# Patient Record
Sex: FEMALE | Race: OTHER | HISPANIC OR LATINO
[De-identification: names, ages, dates, MRNs, and addresses within clinical notes are randomized per-mention and may not be internally consistent; named-entity substitution may affect disease eponyms.]

---

## 2017-04-20 PROBLEM — Z00.00 ENCOUNTER FOR PREVENTIVE HEALTH EXAMINATION: Status: ACTIVE | Noted: 2017-04-20

## 2017-06-09 PROBLEM — Z96.653 STATUS POST TOTAL BILATERAL KNEE REPLACEMENT: Status: ACTIVE | Noted: 2017-06-09

## 2017-06-12 ENCOUNTER — APPOINTMENT (OUTPATIENT)
Dept: ORTHOPEDIC SURGERY | Facility: CLINIC | Age: 72
End: 2017-06-12

## 2017-06-12 VITALS
SYSTOLIC BLOOD PRESSURE: 114 MMHG | WEIGHT: 200 LBS | BODY MASS INDEX: 35.44 KG/M2 | HEIGHT: 63 IN | DIASTOLIC BLOOD PRESSURE: 68 MMHG | HEART RATE: 62 BPM

## 2017-06-12 DIAGNOSIS — Z96.653 PRESENCE OF ARTIFICIAL KNEE JOINT, BILATERAL: ICD-10-CM

## 2017-06-12 DIAGNOSIS — M17.0 BILATERAL PRIMARY OSTEOARTHRITIS OF KNEE: ICD-10-CM

## 2018-05-15 NOTE — H&P ADULT - ASSESSMENT
72 y.o Female former smoker  with PMHx of HTN, Hyperlipidemia, GERD, Hypothyroidism, eye infection who presented to her cardiologist Dr. Rodriguez c/o CCS Anginal Class 3 symptoms, and an abnormal Stress test and now referred to [facility] for recommended Cardiac Cath with possible intervention if clinically indicated to  r/o suspected underlying ischemia.       - Patient loaded with ASA 325mg POx1 and Plavix 600mg PO x1 prior to procedure  - Consent obtained via  ID # [id number]    Risks & benefits of procedure and alternative therapy have been explained to the patient including but not limited to: allergic reaction, bleeding w/possible need for blood transfusion, infection, renal and vascular compromise, limb damage, arrhythmia, stroke, vessel dissection/perforation, Myocardial infarction, emergent CABG. Informed consent obtained and in chart.

## 2018-05-15 NOTE — H&P ADULT - HISTORY OF PRESENT ILLNESS
***SKELETON:      72 y.o Female with PMHx of       Denies CP,  SOB, palpitations, dizziness, syncope, recent PND/orthopnea, LE edema, or decrease in exercise tolerance.        In light of pt's risk factors, above CCS Anginal Class _____ symptoms, and an abnormal Stress test, pt is now referred to [facility] for recommended Cardiac Cath with possible intervention if clinically indicated to  r/o suspected underlying CAD. ***HISTORY OBTAINED FROM PATIENT'S DAUGHTER CHRISTIANO OROPEZA -RELIABLE HISTORIAN     ****PT TO BRING IN ALL MEDS     72 y.o Female former smoker  with PMHx of HTN, Hyperlipidemia, GERD, Hypothyroidism, who presented to her cardiologist Dr. Rodriguez c/o worsening CP and SOB over the last few months.  Pt states she has been experiencing non radiating sub sternal chest "pressure" with dyspnea on exertion  and intermittent episodes of dizziness when walking a distance of more than one block, resolving after a few minutes of rest.  She denies any palpitations,  syncope, recent PND/orthopnea, or LE edema.  Pt subsequently underwent a Nuclear Stress test on     which revealed       Stress Echo     done   revealed           In light of pt's risk factors, above CCS Anginal Class 3 symptoms, and an abnormal Stress test, pt is now referred to [facility] for recommended Cardiac Cath with possible intervention if clinically indicated to  r/o suspected underlying ischemia. ***HISTORY OBTAINED FROM PATIENT'S DAUGHTER CHRISTIANO OROPEZA -RELIABLE HISTORIAN     ****PT TO BRING IN ALL MEDS     72 y.o Female former smoker  with PMHx of HTN, Hyperlipidemia, GERD, Hypothyroidism, who presented to her cardiologist Dr. Rodriguez c/o worsening CP and SOB over the last few months.  Pt states she has been experiencing non radiating sub sternal chest "pressure" with dyspnea on exertion  and intermittent episodes of dizziness when walking a distance of more than one block, resolving after a few minutes of rest.  She denies any palpitations,  syncope, recent PND/orthopnea, or LE edema. Echo performed on 04/02/18 revealed mild MR/AS/AR/TR.  Mild to moderate pulmonary insufficiency.  LVEF of 55-60%.  Stress Echo 04/06/18 was a normal study demonstrating a normal LV wall motion response to exercise.  LVEF of 70%.  Pt subsequently underwent a Nuclear Stress test on 05/07/18 which revealed mild anterior ischemia, LVEF > 70%.         In light of pt's risk factors, above CCS Anginal Class 3 symptoms, and an abnormal Stress test, pt is now referred to [facility] for recommended Cardiac Cath with possible intervention if clinically indicated to  r/o suspected underlying ischemia. ***HISTORY OBTAINED FROM PATIENT'S DAUGHTER CHRISTIANO OROPEZA -RELIABLE HISTORIAN     72 y.o Female former smoker  with PMHx of HTN, Hyperlipidemia, GERD, Hypothyroidism, eye infection who presented to her cardiologist Dr. Rodriguez c/o worsening CP and SOB over the last few months.  Pt states she has been experiencing non radiating sub sternal chest "pressure" with dyspnea on exertion  and intermittent episodes of dizziness when walking a distance of more than one block, resolving after a few minutes of rest.  She denies any palpitations,  syncope, recent PND/orthopnea, or LE edema. Echo performed on 04/02/18 revealed mild MR/AS/AR/TR.  Mild to moderate pulmonary insufficiency.  LVEF of 55-60%.  Stress Echo 04/06/18 was a normal study demonstrating a normal LV wall motion response to exercise.  LVEF of 70%.  Pt subsequently underwent a Nuclear Stress test on 05/07/18 which revealed mild anterior ischemia, LVEF > 70%.       In light of pt's risk factors, above CCS Anginal Class 3 symptoms, and an abnormal Stress test, pt is now referred to [facility] for recommended Cardiac Cath with possible intervention if clinically indicated to  r/o suspected underlying ischemia.

## 2018-05-17 ENCOUNTER — OUTPATIENT (OUTPATIENT)
Dept: OUTPATIENT SERVICES | Facility: HOSPITAL | Age: 73
LOS: 1 days | Discharge: NURSING, REHAB OR LONG-TERM CARE | End: 2018-05-17
Payer: MEDICARE

## 2018-05-17 DIAGNOSIS — Z96.642 PRESENCE OF LEFT ARTIFICIAL HIP JOINT: Chronic | ICD-10-CM

## 2018-05-17 DIAGNOSIS — I49.9 CARDIAC ARRHYTHMIA, UNSPECIFIED: ICD-10-CM

## 2018-05-17 DIAGNOSIS — Z96.641 PRESENCE OF RIGHT ARTIFICIAL HIP JOINT: Chronic | ICD-10-CM

## 2018-05-17 LAB
ALBUMIN SERPL ELPH-MCNC: 4.3 G/DL — SIGNIFICANT CHANGE UP (ref 3.3–5)
ALP SERPL-CCNC: 86 U/L — SIGNIFICANT CHANGE UP (ref 40–120)
ALT FLD-CCNC: 32 U/L — SIGNIFICANT CHANGE UP (ref 10–45)
ANION GAP SERPL CALC-SCNC: 11 MMOL/L — SIGNIFICANT CHANGE UP (ref 5–17)
APTT BLD: 33.7 SEC — SIGNIFICANT CHANGE UP (ref 27.5–37.4)
AST SERPL-CCNC: 31 U/L — SIGNIFICANT CHANGE UP (ref 10–40)
BASOPHILS NFR BLD AUTO: 0.4 % — SIGNIFICANT CHANGE UP (ref 0–2)
BILIRUB SERPL-MCNC: 0.5 MG/DL — SIGNIFICANT CHANGE UP (ref 0.2–1.2)
BUN SERPL-MCNC: 16 MG/DL — SIGNIFICANT CHANGE UP (ref 7–23)
CALCIUM SERPL-MCNC: 9.7 MG/DL — SIGNIFICANT CHANGE UP (ref 8.4–10.5)
CHLORIDE SERPL-SCNC: 97 MMOL/L — SIGNIFICANT CHANGE UP (ref 96–108)
CHOLEST SERPL-MCNC: 148 MG/DL — SIGNIFICANT CHANGE UP (ref 10–199)
CK MB CFR SERPL CALC: 2 NG/ML — SIGNIFICANT CHANGE UP (ref 0–6.7)
CK SERPL-CCNC: 107 U/L — SIGNIFICANT CHANGE UP (ref 25–170)
CO2 SERPL-SCNC: 28 MMOL/L — SIGNIFICANT CHANGE UP (ref 22–31)
CREAT SERPL-MCNC: 0.92 MG/DL — SIGNIFICANT CHANGE UP (ref 0.5–1.3)
CRP SERPL-MCNC: 0.71 MG/DL — HIGH (ref 0–0.4)
EOSINOPHIL NFR BLD AUTO: 5.2 % — SIGNIFICANT CHANGE UP (ref 0–6)
GLUCOSE SERPL-MCNC: 103 MG/DL — HIGH (ref 70–99)
HBA1C BLD-MCNC: 6 % — HIGH (ref 4–5.6)
HCT VFR BLD CALC: 45.5 % — HIGH (ref 34.5–45)
HDLC SERPL-MCNC: 51 MG/DL — SIGNIFICANT CHANGE UP (ref 40–125)
HGB BLD-MCNC: 15 G/DL — SIGNIFICANT CHANGE UP (ref 11.5–15.5)
INR BLD: 1.09 — SIGNIFICANT CHANGE UP (ref 0.88–1.16)
LIPID PNL WITH DIRECT LDL SERPL: 69 MG/DL — SIGNIFICANT CHANGE UP
LYMPHOCYTES # BLD AUTO: 38.6 % — SIGNIFICANT CHANGE UP (ref 13–44)
MCHC RBC-ENTMCNC: 28.4 PG — SIGNIFICANT CHANGE UP (ref 27–34)
MCHC RBC-ENTMCNC: 33 G/DL — SIGNIFICANT CHANGE UP (ref 32–36)
MCV RBC AUTO: 86.2 FL — SIGNIFICANT CHANGE UP (ref 80–100)
MONOCYTES NFR BLD AUTO: 8.8 % — SIGNIFICANT CHANGE UP (ref 2–14)
NEUTROPHILS NFR BLD AUTO: 47 % — SIGNIFICANT CHANGE UP (ref 43–77)
PLATELET # BLD AUTO: 285 K/UL — SIGNIFICANT CHANGE UP (ref 150–400)
POTASSIUM SERPL-MCNC: 4.4 MMOL/L — SIGNIFICANT CHANGE UP (ref 3.5–5.3)
POTASSIUM SERPL-SCNC: 4.4 MMOL/L — SIGNIFICANT CHANGE UP (ref 3.5–5.3)
PROT SERPL-MCNC: 8.6 G/DL — HIGH (ref 6–8.3)
PROTHROM AB SERPL-ACNC: 12.1 SEC — SIGNIFICANT CHANGE UP (ref 9.8–12.7)
RBC # BLD: 5.28 M/UL — HIGH (ref 3.8–5.2)
RBC # FLD: 13.9 % — SIGNIFICANT CHANGE UP (ref 10.3–16.9)
SODIUM SERPL-SCNC: 136 MMOL/L — SIGNIFICANT CHANGE UP (ref 135–145)
TOTAL CHOLESTEROL/HDL RATIO MEASUREMENT: 2.9 RATIO — LOW (ref 3.3–7.1)
TRIGL SERPL-MCNC: 138 MG/DL — SIGNIFICANT CHANGE UP (ref 10–149)
WBC # BLD: 6.7 K/UL — SIGNIFICANT CHANGE UP (ref 3.8–10.5)
WBC # FLD AUTO: 6.7 K/UL — SIGNIFICANT CHANGE UP (ref 3.8–10.5)

## 2018-05-17 PROCEDURE — C1769: CPT

## 2018-05-17 PROCEDURE — 86140 C-REACTIVE PROTEIN: CPT

## 2018-05-17 PROCEDURE — 82550 ASSAY OF CK (CPK): CPT

## 2018-05-17 PROCEDURE — 80053 COMPREHEN METABOLIC PANEL: CPT

## 2018-05-17 PROCEDURE — C1887: CPT

## 2018-05-17 PROCEDURE — 83036 HEMOGLOBIN GLYCOSYLATED A1C: CPT

## 2018-05-17 PROCEDURE — 93458 L HRT ARTERY/VENTRICLE ANGIO: CPT

## 2018-05-17 PROCEDURE — C1889: CPT

## 2018-05-17 PROCEDURE — 85025 COMPLETE CBC W/AUTO DIFF WBC: CPT

## 2018-05-17 PROCEDURE — 80061 LIPID PANEL: CPT

## 2018-05-17 PROCEDURE — 85610 PROTHROMBIN TIME: CPT

## 2018-05-17 PROCEDURE — 85730 THROMBOPLASTIN TIME PARTIAL: CPT

## 2018-05-17 PROCEDURE — 82553 CREATINE MB FRACTION: CPT

## 2018-05-17 PROCEDURE — C1894: CPT

## 2018-05-17 PROCEDURE — 93458 L HRT ARTERY/VENTRICLE ANGIO: CPT | Mod: 26

## 2018-05-17 PROCEDURE — C1760: CPT

## 2018-05-17 RX ORDER — NITROGLYCERIN 6.5 MG
1 CAPSULE, EXTENDED RELEASE ORAL
Qty: 0 | Refills: 0 | COMMUNITY

## 2018-05-17 RX ORDER — BRIMONIDINE TARTRATE 2 MG/MG
1 SOLUTION/ DROPS OPHTHALMIC
Qty: 0 | Refills: 0 | COMMUNITY

## 2018-05-17 RX ORDER — CLOPIDOGREL BISULFATE 75 MG/1
600 TABLET, FILM COATED ORAL ONCE
Qty: 0 | Refills: 0 | Status: COMPLETED | OUTPATIENT
Start: 2018-05-17 | End: 2018-05-17

## 2018-05-17 RX ORDER — CIPROFLOXACIN HCL 0.3 %
1 DROPS OPHTHALMIC (EYE) DAILY
Qty: 0 | Refills: 0 | Status: DISCONTINUED | OUTPATIENT
Start: 2018-05-17 | End: 2018-05-17

## 2018-05-17 RX ORDER — LEVOTHYROXINE SODIUM 125 MCG
1 TABLET ORAL
Qty: 0 | Refills: 0 | COMMUNITY

## 2018-05-17 RX ORDER — ASPIRIN/CALCIUM CARB/MAGNESIUM 324 MG
325 TABLET ORAL ONCE
Qty: 0 | Refills: 0 | Status: COMPLETED | OUTPATIENT
Start: 2018-05-17 | End: 2018-05-17

## 2018-05-17 RX ORDER — HYDROCHLOROTHIAZIDE 25 MG
12.5 TABLET ORAL DAILY
Qty: 0 | Refills: 0 | Status: DISCONTINUED | OUTPATIENT
Start: 2018-05-17 | End: 2018-05-17

## 2018-05-17 RX ORDER — THYROID 120 MG
60 TABLET ORAL DAILY
Qty: 0 | Refills: 0 | Status: DISCONTINUED | OUTPATIENT
Start: 2018-05-17 | End: 2018-05-17

## 2018-05-17 RX ORDER — METOPROLOL TARTRATE 50 MG
1 TABLET ORAL
Qty: 0 | Refills: 0 | COMMUNITY

## 2018-05-17 RX ORDER — BRIMONIDINE TARTRATE 2 MG/MG
1 SOLUTION/ DROPS OPHTHALMIC DAILY
Qty: 0 | Refills: 0 | Status: DISCONTINUED | OUTPATIENT
Start: 2018-05-17 | End: 2018-05-17

## 2018-05-17 RX ORDER — SODIUM CHLORIDE 9 MG/ML
500 INJECTION INTRAMUSCULAR; INTRAVENOUS; SUBCUTANEOUS
Qty: 0 | Refills: 0 | Status: DISCONTINUED | OUTPATIENT
Start: 2018-05-17 | End: 2018-05-17

## 2018-05-17 RX ORDER — ACETAMINOPHEN 500 MG
650 TABLET ORAL ONCE
Qty: 0 | Refills: 0 | Status: COMPLETED | OUTPATIENT
Start: 2018-05-17 | End: 2018-05-17

## 2018-05-17 RX ORDER — CIPROFLOXACIN HCL 0.3 %
2 DROPS OPHTHALMIC (EYE)
Qty: 0 | Refills: 0 | COMMUNITY

## 2018-05-17 RX ORDER — AMLODIPINE BESYLATE 2.5 MG/1
10 TABLET ORAL DAILY
Qty: 0 | Refills: 0 | Status: DISCONTINUED | OUTPATIENT
Start: 2018-05-17 | End: 2018-05-17

## 2018-05-17 RX ORDER — CHLORHEXIDINE GLUCONATE 213 G/1000ML
1 SOLUTION TOPICAL ONCE
Qty: 0 | Refills: 0 | Status: DISCONTINUED | OUTPATIENT
Start: 2018-05-17 | End: 2018-05-17

## 2018-05-17 RX ORDER — KETOROLAC TROMETHAMINE 0.5 %
1 DROPS OPHTHALMIC (EYE) DAILY
Qty: 0 | Refills: 0 | Status: DISCONTINUED | OUTPATIENT
Start: 2018-05-17 | End: 2018-05-17

## 2018-05-17 RX ORDER — SODIUM CHLORIDE 9 MG/ML
1000 INJECTION INTRAMUSCULAR; INTRAVENOUS; SUBCUTANEOUS
Qty: 0 | Refills: 0 | Status: DISCONTINUED | OUTPATIENT
Start: 2018-05-17 | End: 2018-05-17

## 2018-05-17 RX ORDER — ATORVASTATIN CALCIUM 80 MG/1
40 TABLET, FILM COATED ORAL AT BEDTIME
Qty: 0 | Refills: 0 | Status: DISCONTINUED | OUTPATIENT
Start: 2018-05-17 | End: 2018-05-17

## 2018-05-17 RX ORDER — ISOSORBIDE MONONITRATE 60 MG/1
30 TABLET, EXTENDED RELEASE ORAL DAILY
Qty: 0 | Refills: 0 | Status: DISCONTINUED | OUTPATIENT
Start: 2018-05-17 | End: 2018-05-17

## 2018-05-17 RX ORDER — KETOROLAC TROMETHAMINE 0.5 %
1 DROPS OPHTHALMIC (EYE)
Qty: 0 | Refills: 0 | COMMUNITY

## 2018-05-17 RX ADMIN — Medication 325 MILLIGRAM(S): at 13:06

## 2018-05-17 RX ADMIN — CLOPIDOGREL BISULFATE 600 MILLIGRAM(S): 75 TABLET, FILM COATED ORAL at 13:03

## 2018-05-17 RX ADMIN — SODIUM CHLORIDE 75 MILLILITER(S): 9 INJECTION INTRAMUSCULAR; INTRAVENOUS; SUBCUTANEOUS at 13:04

## 2018-05-17 RX ADMIN — Medication 650 MILLIGRAM(S): at 15:30

## 2018-05-17 RX ADMIN — SODIUM CHLORIDE 75 MILLILITER(S): 9 INJECTION INTRAMUSCULAR; INTRAVENOUS; SUBCUTANEOUS at 15:26

## 2018-05-17 NOTE — CONSULT NOTE ADULT - PROBLEM SELECTOR RECOMMENDATION 9
F/u with Dr. Jimenez in clinic on Monday June 4 at 11:50 am - Repeat outpatient event monitor.   - F/u with Dr. Jimenez in clinic on Monday June 4 at 11:50 am to discuss next steps. - Repeat outpatient event monitor.   - Recommend avoiding AV jt blocking agents if possible.   - F/u with Dr. Jimenez in clinic on Monday June 4 at 11:50 am to discuss next steps.

## 2018-05-17 NOTE — PROGRESS NOTE ADULT - SUBJECTIVE AND OBJECTIVE BOX
Interventional Cardiology PA SDA Discharge Note    Patient without complaints. Ambulated and voided without difficulties    Afebrile, VSS    Ext:    		Right Groin: soft, NT, no hematoma, no bruit, dressing; C/D/I        Pulses:    intact right DP/PT    A/P:  72 yr old F former smoker with PMHx of HTN, Hyperlipidemia, GERD, Hypothyroidism, eye infection who presented to her cardiologist Dr. Rodriguez c/o CCS Anginal Class 3 symptoms, and an abnormal Stress test and now referred to [facility] for recommended Cardiac Cath with possible intervention if clinically indicated to  r/o suspected underlying ischemia.   Patient s/p diagnostic cath revealing mild mLAD disease, mild LCx/RCA disease, EF:65%. Patient recommended to continue medical therapy and follow-up with Dr. Rodriguez in 1-2 weeks.            1.	Stable for discharge as per attending Dr. Rodriguez after bed rest, pt voids, groin stable and 30 minutes of ambulation.  2.	Follow-up with PMD/Cardiologist Dr. Rodriguez in 1-2 weeks  3.	Discharged forms signed and copies in chart

## 2018-05-17 NOTE — CONSULT NOTE ADULT - ASSESSMENT
72 yr old F former smoker with PMHx of HTN, Hyperlipidemia, GERD, Hypothyroidism, and eye infection who presented to her cardiologist Dr. Rodriguez c/o CCS Anginal Class 3 symptoms, and an abnormal Stress test and now referred to [facility] for recommended Cardiac Cath with possible intervention if clinically indicated to r/o suspected underlying ischemia. Patient now s/p diagnostic cath revealing mild mLAD disease, mild LCx/RCA disease, EF: 65%. Patient recommended to continue medical therapy and follow-up with Dr. Rodriguez in 1-2 weeks.    The patient had a past Holter monitor with her cardiologist that showed sinus bradycardia and junctional escape rhythm. EP has been consulted in light of possible sick sinus syndrome.

## 2018-05-17 NOTE — CONSULT NOTE ADULT - SUBJECTIVE AND OBJECTIVE BOX
HISTORY OF PRESENT ILLNESS: 72 yr old F former smoker with PMHx of HTN, Hyperlipidemia, GERD, Hypothyroidism, and eye infection who presented to her cardiologist Dr. Rodriguez c/o CCS Anginal Class 3 symptoms, and an abnormal Stress test and now referred to [facility] for recommended Cardiac Cath with possible intervention if clinically indicated to r/o suspected underlying ischemia. Patient now s/p diagnostic cath revealing mild mLAD disease, mild LCx/RCA disease, EF: 65%. Patient recommended to continue medical therapy and follow-up with Dr. Rodriguez in 1-2 weeks.    The patient had a past Holter monitor with her cardiologist that showed sinus bradycardia and junctional escape rhythm. EP has been consulted in light of possible sick sinus syndrome.    PAST MEDICAL & SURGICAL HISTORY:  GERD (gastroesophageal reflux disease)  Hypothyroidism  Hyperlipidemia  Hypertension  History of total right hip replacement  History of total left hip replacement    Allergies  No Known Allergies    MEDICATIONS:  sodium chloride 0.9%. 500 milliLiter(s) IV Continuous <Continuous>    FAMILY HISTORY:  Myocardial infarction (Father)    SOCIAL HISTORY:    [X] Non-smoker  [ ] Smoker  [ ] Alcohol    REVIEW OF SYSTEMS:    CONSTITUTIONAL: No fever, weight loss, or fatigue  EYES: No eye pain, visual disturbances, or discharge  ENMT:  No difficulty hearing, tinnitus, vertigo; No sinus or throat pain  NECK: No pain or stiffness  BREASTS: No pain, masses, or nipple discharge  RESPIRATORY: No cough, wheezing, chills or hemoptysis; No Shortness of Breath  CARDIOVASCULAR: No chest pain, palpitations, dizziness, or leg swelling  GASTROINTESTINAL: No abdominal or epigastric pain. + nausea, no vomiting, or hematemesis; No diarrhea or constipation. No melena or hematochezia.  GENITOURINARY: No dysuria, frequency, hematuria, or incontinence  NEUROLOGICAL: + headache, no memory loss, loss of strength, numbness, or tremors  SKIN: No itching, burning, rashes, or lesions   LYMPH Nodes: No enlarged glands  ENDOCRINE: No heat or cold intolerance; No hair loss  MUSCULOSKELETAL: No joint pain or swelling; No muscle, back, or extremity pain  PSYCHIATRIC: No depression, anxiety, mood swings, or difficulty sleeping  HEME/LYMPH: No easy bruising, or bleeding gums  ALLERY AND IMMUNOLOGIC: No hives or eczema	    [X] All others negative	  [ ] Unable to obtain    PHYSICAL EXAM:    TELEMETRY: Sinus rhythm 55-60 bpm 	      Appearance: Normal	  HEENT:   Normal oral mucosa, PERRL, EOMI	  Cardiovascular: Normal S1 S2, No JVD, No murmurs, No edema  Respiratory: Lungs clear to auscultation	  Gastrointestinal:  Soft, Non-tender, + BS	  Neurologic: A&O x 3, Non-focal  Extremities: Normal range of motion, No clubbing, cyanosis or edema  Vascular: Peripheral pulses palpable 2+ bilaterally    	  LABS:	 	             15.0   6.7   )-----------( 285      ( 17 May 2018 12:05 )             45.5     05-17    136  |  97  |  16  ----------------------------<  103<H>  4.4   |  28  |  0.92    Ca    9.7      17 May 2018 12:05    TPro  8.6<H>  /  Alb  4.3  /  TBili  0.5  /  DBili  x   /  AST  31  /  ALT  32  /  AlkPhos  86  05-17 HISTORY OF PRESENT ILLNESS: 72 yr old F former smoker with PMHx of HTN, Hyperlipidemia, GERD, Hypothyroidism, and eye infection who presented to her cardiologist Dr. Rodriguez c/o CCS Anginal Class 3 symptoms, and an abnormal Stress test and now referred to [facility] for recommended Cardiac Cath with possible intervention if clinically indicated to r/o suspected underlying ischemia. Patient now s/p diagnostic cath revealing mild mLAD disease, mild LCx/RCA disease, EF: 65%. Patient recommended to continue medical therapy and follow-up with Dr. Rodriguez in 1-2 weeks.    The patient had a past Holter monitor with her cardiologist that showed sinus bradycardia and junctional escape rhythm. EP has been consulted in light of possible sick sinus syndrome.    PAST MEDICAL & SURGICAL HISTORY:  GERD (gastroesophageal reflux disease)  Hypothyroidism  Hyperlipidemia  Hypertension  History of total right hip replacement  History of total left hip replacement    Allergies  No Known Allergies    MEDICATIONS:  sodium chloride 0.9%. 500 milliLiter(s) IV Continuous <Continuous>    FAMILY HISTORY:  Myocardial infarction (Father)    SOCIAL HISTORY:    [X] Non-smoker  [ ] Smoker  [ ] Alcohol    REVIEW OF SYSTEMS:    CONSTITUTIONAL: No fever, weight loss, or fatigue  EYES: No eye pain, visual disturbances, or discharge  ENMT:  No difficulty hearing, tinnitus, vertigo; No sinus or throat pain  NECK: No pain or stiffness  BREASTS: No pain, masses, or nipple discharge  RESPIRATORY: No cough, wheezing, chills or hemoptysis; No Shortness of Breath  CARDIOVASCULAR: No chest pain, palpitations, dizziness, or leg swelling  GASTROINTESTINAL: No abdominal or epigastric pain. + nausea, no vomiting, or hematemesis; No diarrhea or constipation. No melena or hematochezia.  GENITOURINARY: No dysuria, frequency, hematuria, or incontinence  NEUROLOGICAL: + headache, no memory loss, loss of strength, numbness, or tremors  SKIN: No itching, burning, rashes, or lesions   LYMPH Nodes: No enlarged glands  ENDOCRINE: No heat or cold intolerance; No hair loss  MUSCULOSKELETAL: No joint pain or swelling; No muscle, back, or extremity pain  PSYCHIATRIC: No depression, anxiety, mood swings, or difficulty sleeping  HEME/LYMPH: No easy bruising, or bleeding gums  ALLERY AND IMMUNOLOGIC: No hives or eczema	    [X] All others negative	  [ ] Unable to obtain    PHYSICAL EXAM:    HR: 56  BP: 96/49  SpO2: 98%  RR 14    TELEMETRY: Sinus rhythm 55-60 bpm 	      Appearance: Normal	  HEENT:   Normal oral mucosa, PERRL, EOMI	  Cardiovascular: Normal S1 S2, No JVD, No murmurs, No edema  Respiratory: Lungs clear to auscultation	  Gastrointestinal:  Soft, Non-tender, + BS	  Neurologic: A&O x 3, Non-focal  Extremities: Normal range of motion, No clubbing, cyanosis or edema  Vascular: Peripheral pulses palpable 2+ bilaterally    	  LABS:	 	             15.0   6.7   )-----------( 285      ( 17 May 2018 12:05 )             45.5     05-17    136  |  97  |  16  ----------------------------<  103<H>  4.4   |  28  |  0.92    Ca    9.7      17 May 2018 12:05    TPro  8.6<H>  /  Alb  4.3  /  TBili  0.5  /  DBili  x   /  AST  31  /  ALT  32  /  AlkPhos  86  05-17

## 2018-05-31 ENCOUNTER — APPOINTMENT (OUTPATIENT)
Dept: HEART AND VASCULAR | Facility: CLINIC | Age: 73
End: 2018-05-31
Payer: MEDICARE

## 2018-05-31 PROCEDURE — 93228 REMOTE 30 DAY ECG REV/REPORT: CPT

## 2018-06-04 ENCOUNTER — APPOINTMENT (OUTPATIENT)
Dept: HEART AND VASCULAR | Facility: CLINIC | Age: 73
End: 2018-06-04
Payer: MEDICARE

## 2018-06-04 VITALS
WEIGHT: 187 LBS | DIASTOLIC BLOOD PRESSURE: 56 MMHG | BODY MASS INDEX: 33.13 KG/M2 | HEIGHT: 63 IN | HEART RATE: 61 BPM | SYSTOLIC BLOOD PRESSURE: 119 MMHG

## 2018-06-04 DIAGNOSIS — I44.2 ATRIOVENTRICULAR BLOCK, COMPLETE: ICD-10-CM

## 2018-06-04 PROBLEM — K21.9 GASTRO-ESOPHAGEAL REFLUX DISEASE WITHOUT ESOPHAGITIS: Chronic | Status: ACTIVE | Noted: 2018-05-16

## 2018-06-04 PROBLEM — I10 ESSENTIAL (PRIMARY) HYPERTENSION: Chronic | Status: ACTIVE | Noted: 2018-05-16

## 2018-06-04 PROBLEM — E78.5 HYPERLIPIDEMIA, UNSPECIFIED: Chronic | Status: ACTIVE | Noted: 2018-05-16

## 2018-06-04 PROBLEM — E03.9 HYPOTHYROIDISM, UNSPECIFIED: Chronic | Status: ACTIVE | Noted: 2018-05-16

## 2018-06-04 PROCEDURE — 93227 XTRNL ECG REC<48 HR R&I: CPT

## 2018-06-04 PROCEDURE — 99215 OFFICE O/P EST HI 40 MIN: CPT | Mod: 25

## 2018-06-06 PROBLEM — I44.2 ACCELERATED IDIOVENTRICULAR RHYTHM: Status: ACTIVE | Noted: 2018-06-06

## 2018-06-06 RX ORDER — ATORVASTATIN CALCIUM 40 MG/1
40 TABLET, FILM COATED ORAL
Refills: 0 | Status: ACTIVE | COMMUNITY

## 2018-06-06 RX ORDER — ASPIRIN ENTERIC COATED TABLETS 81 MG 81 MG/1
81 TABLET, DELAYED RELEASE ORAL
Refills: 0 | Status: ACTIVE | COMMUNITY

## 2018-12-03 ENCOUNTER — APPOINTMENT (OUTPATIENT)
Dept: HEART AND VASCULAR | Facility: CLINIC | Age: 73
End: 2018-12-03
Payer: MEDICARE

## 2018-12-03 ENCOUNTER — NON-APPOINTMENT (OUTPATIENT)
Age: 73
End: 2018-12-03

## 2018-12-03 VITALS
HEIGHT: 63 IN | HEART RATE: 55 BPM | WEIGHT: 182 LBS | BODY MASS INDEX: 32.25 KG/M2 | DIASTOLIC BLOOD PRESSURE: 66 MMHG | SYSTOLIC BLOOD PRESSURE: 150 MMHG

## 2018-12-03 PROCEDURE — 93000 ELECTROCARDIOGRAM COMPLETE: CPT

## 2018-12-03 PROCEDURE — 99215 OFFICE O/P EST HI 40 MIN: CPT | Mod: 25

## 2018-12-03 RX ORDER — LOSARTAN POTASSIUM AND HYDROCHLOROTHIAZIDE 50; 12.5 MG/1; MG/1
50-12.5 TABLET, FILM COATED ORAL
Refills: 0 | Status: DISCONTINUED | COMMUNITY
End: 2018-12-03

## 2018-12-03 RX ORDER — AMLODIPINE BESYLATE 10 MG/1
10 TABLET ORAL
Refills: 0 | Status: DISCONTINUED | COMMUNITY
End: 2018-12-03

## 2018-12-03 RX ORDER — LEVOCETIRIZINE DIHYDROCHLORIDE 5 MG/1
5 TABLET ORAL
Refills: 0 | Status: ACTIVE | COMMUNITY

## 2018-12-03 NOTE — REASON FOR VISIT
[Follow-Up - Clinic] : a clinic follow-up of [Abnormal ECG] : an abnormal ECG [Abnormal Test Result] : an abnormal test result

## 2018-12-03 NOTE — PHYSICAL EXAM
[General Appearance - Well Developed] : well developed [Normal Appearance] : normal appearance [Well Groomed] : well groomed [General Appearance - Well Nourished] : well nourished [No Deformities] : no deformities [General Appearance - In No Acute Distress] : no acute distress [Normal Conjunctiva] : the conjunctiva exhibited no abnormalities [Eyelids - No Xanthelasma] : the eyelids demonstrated no xanthelasmas [Normal Oral Mucosa] : normal oral mucosa [No Oral Pallor] : no oral pallor [No Oral Cyanosis] : no oral cyanosis [Normal Jugular Venous A Waves Present] : normal jugular venous A waves present [Normal Jugular Venous V Waves Present] : normal jugular venous V waves present [No Jugular Venous Wynne A Waves] : no jugular venous wynne A waves [Respiration, Rhythm And Depth] : normal respiratory rhythm and effort [Exaggerated Use Of Accessory Muscles For Inspiration] : no accessory muscle use [Auscultation Breath Sounds / Voice Sounds] : lungs were clear to auscultation bilaterally [Heart Rate And Rhythm] : heart rate and rhythm were normal [Heart Sounds] : normal S1 and S2 [Murmurs] : no murmurs present [Abdomen Soft] : soft [Abdomen Tenderness] : non-tender [Abdomen Mass (___ Cm)] : no abdominal mass palpated [Abnormal Walk] : normal gait [Gait - Sufficient For Exercise Testing] : the gait was sufficient for exercise testing [Nail Clubbing] : no clubbing of the fingernails [Cyanosis, Localized] : no localized cyanosis [Petechial Hemorrhages (___cm)] : no petechial hemorrhages [Skin Color & Pigmentation] : normal skin color and pigmentation [] : no rash [No Venous Stasis] : no venous stasis [Skin Lesions] : no skin lesions [No Skin Ulcers] : no skin ulcer [No Xanthoma] : no  xanthoma was observed [Oriented To Time, Place, And Person] : oriented to person, place, and time [Affect] : the affect was normal [Mood] : the mood was normal [No Anxiety] : not feeling anxious

## 2018-12-05 NOTE — DISCUSSION/SUMMARY
[FreeTextEntry1] : Ms. Tang is a 72 yo Female with symptomatic sinus node dysfunction and syncope.  I have recommended she undergo PPM placement.  Risks of the procedure, including but not limited to infection, vascular injury, cardiac perforation were discussed with her in detail.  She understands and wishes to proceed.
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
## 2018-12-05 NOTE — REVIEW OF SYSTEMS
[Shortness Of Breath] : shortness of breath [Dyspnea on exertion] : dyspnea during exertion [Negative] : Heme/Lymph [see HPI] : see HPI

## 2019-01-16 ENCOUNTER — OUTPATIENT (OUTPATIENT)
Dept: OUTPATIENT SERVICES | Facility: HOSPITAL | Age: 74
LOS: 1 days | Discharge: HOME | End: 2019-01-16
Payer: MEDICARE

## 2019-01-16 VITALS
DIASTOLIC BLOOD PRESSURE: 70 MMHG | OXYGEN SATURATION: 100 % | SYSTOLIC BLOOD PRESSURE: 150 MMHG | HEART RATE: 50 BPM | RESPIRATION RATE: 16 BRPM

## 2019-01-16 DIAGNOSIS — Z90.49 ACQUIRED ABSENCE OF OTHER SPECIFIED PARTS OF DIGESTIVE TRACT: Chronic | ICD-10-CM

## 2019-01-16 DIAGNOSIS — R00.1 BRADYCARDIA, UNSPECIFIED: ICD-10-CM

## 2019-01-16 DIAGNOSIS — Z96.642 PRESENCE OF LEFT ARTIFICIAL HIP JOINT: Chronic | ICD-10-CM

## 2019-01-16 DIAGNOSIS — Z96.641 PRESENCE OF RIGHT ARTIFICIAL HIP JOINT: Chronic | ICD-10-CM

## 2019-01-16 DIAGNOSIS — Z98.49 CATARACT EXTRACTION STATUS, UNSPECIFIED EYE: Chronic | ICD-10-CM

## 2019-01-16 PROCEDURE — 33208 INSRT HEART PM ATRIAL & VENT: CPT | Mod: KX

## 2019-01-16 RX ORDER — LOSARTAN POTASSIUM 100 MG/1
50 TABLET, FILM COATED ORAL ONCE
Qty: 0 | Refills: 0 | Status: COMPLETED | OUTPATIENT
Start: 2019-01-16 | End: 2019-01-17

## 2019-01-16 RX ORDER — ACETAMINOPHEN 500 MG
650 TABLET ORAL EVERY 6 HOURS
Qty: 0 | Refills: 0 | Status: DISCONTINUED | OUTPATIENT
Start: 2019-01-16 | End: 2019-01-17

## 2019-01-16 RX ORDER — BRIMONIDINE TARTRATE 2 MG/MG
0 SOLUTION/ DROPS OPHTHALMIC
Qty: 0 | Refills: 0 | COMMUNITY

## 2019-01-16 RX ORDER — ATORVASTATIN CALCIUM 80 MG/1
40 TABLET, FILM COATED ORAL DAILY
Qty: 0 | Refills: 0 | Status: DISCONTINUED | OUTPATIENT
Start: 2019-01-16 | End: 2019-01-17

## 2019-01-16 RX ORDER — THYROID 120 MG
1 TABLET ORAL
Qty: 0 | Refills: 0 | COMMUNITY

## 2019-01-16 RX ORDER — ISOSORBIDE MONONITRATE 60 MG/1
30 TABLET, EXTENDED RELEASE ORAL DAILY
Qty: 0 | Refills: 0 | Status: DISCONTINUED | OUTPATIENT
Start: 2019-01-16 | End: 2019-01-17

## 2019-01-16 RX ORDER — CEFAZOLIN SODIUM 1 G
1000 VIAL (EA) INJECTION EVERY 8 HOURS
Qty: 0 | Refills: 0 | Status: COMPLETED | OUTPATIENT
Start: 2019-01-16 | End: 2019-01-17

## 2019-01-16 RX ORDER — ASPIRIN/CALCIUM CARB/MAGNESIUM 324 MG
81 TABLET ORAL EVERY 24 HOURS
Qty: 0 | Refills: 0 | Status: DISCONTINUED | OUTPATIENT
Start: 2019-01-16 | End: 2019-01-17

## 2019-01-16 RX ORDER — ATORVASTATIN CALCIUM 80 MG/1
1 TABLET, FILM COATED ORAL
Qty: 0 | Refills: 0 | COMMUNITY

## 2019-01-16 RX ORDER — KETOROLAC TROMETHAMINE 0.5 %
0 DROPS OPHTHALMIC (EYE)
Qty: 0 | Refills: 0 | COMMUNITY

## 2019-01-16 RX ORDER — ASPIRIN/CALCIUM CARB/MAGNESIUM 324 MG
1 TABLET ORAL
Qty: 0 | Refills: 0 | COMMUNITY

## 2019-01-16 RX ORDER — CIPROFLOXACIN HCL 0.3 %
0 DROPS OPHTHALMIC (EYE)
Qty: 0 | Refills: 0 | COMMUNITY

## 2019-01-16 RX ORDER — INFLUENZA VIRUS VACCINE 15; 15; 15; 15 UG/.5ML; UG/.5ML; UG/.5ML; UG/.5ML
0.5 SUSPENSION INTRAMUSCULAR ONCE
Qty: 0 | Refills: 0 | Status: DISCONTINUED | OUTPATIENT
Start: 2019-01-16 | End: 2019-01-17

## 2019-01-16 RX ORDER — AMLODIPINE BESYLATE 2.5 MG/1
1 TABLET ORAL
Qty: 0 | Refills: 0 | COMMUNITY

## 2019-01-16 RX ORDER — LOSARTAN/HYDROCHLOROTHIAZIDE 100MG-25MG
1 TABLET ORAL
Qty: 0 | Refills: 0 | COMMUNITY

## 2019-01-16 RX ORDER — ISOSORBIDE MONONITRATE 60 MG/1
1 TABLET, EXTENDED RELEASE ORAL
Qty: 0 | Refills: 0 | COMMUNITY

## 2019-01-16 RX ADMIN — Medication 650 MILLIGRAM(S): at 20:46

## 2019-01-16 RX ADMIN — Medication 100 MILLIGRAM(S): at 21:45

## 2019-01-16 RX ADMIN — Medication 650 MILLIGRAM(S): at 21:34

## 2019-01-16 RX ADMIN — ATORVASTATIN CALCIUM 40 MILLIGRAM(S): 80 TABLET, FILM COATED ORAL at 20:48

## 2019-01-16 RX ADMIN — Medication 81 MILLIGRAM(S): at 16:52

## 2019-01-16 RX ADMIN — Medication 100 MILLIGRAM(S): at 14:12

## 2019-01-16 RX ADMIN — ISOSORBIDE MONONITRATE 30 MILLIGRAM(S): 60 TABLET, EXTENDED RELEASE ORAL at 16:52

## 2019-01-16 NOTE — PATIENT PROFILE ADULT - NSPROEDALEARNPREF_GEN_A_NUR
skill demonstration/audio/computer/internet/pictorial/written material/group instruction/video/verbal instruction/individual instruction

## 2019-01-16 NOTE — H&P ADULT - PMH
Bradycardia    GERD (gastroesophageal reflux disease)    Hyperlipidemia    Hypertension    Hypothyroidism

## 2019-01-16 NOTE — PATIENT PROFILE ADULT - NSPROEDALEARNPREFOTH_GEN_A_NUR
individual instruction/audio/skill demonstration/verbal instruction/video/group instruction/pictorial/computer/internet/written material

## 2019-01-16 NOTE — H&P ADULT - ASSESSMENT
73 year old female with HTN, Hyperlipidemia, GERD, Hypothyroidism and symptomatic sinus bradycardia who presents today for elective dual chamber pacemaker.

## 2019-01-16 NOTE — H&P ADULT - PSH
History of total left hip replacement    History of total right hip replacement    S/P cataract extraction    S/P cholecystectomy
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
## 2019-01-17 ENCOUNTER — TRANSCRIPTION ENCOUNTER (OUTPATIENT)
Age: 74
End: 2019-01-17

## 2019-01-17 VITALS — TEMPERATURE: 96 F

## 2019-01-17 PROCEDURE — 33208 INSRT HEART PM ATRIAL & VENT: CPT

## 2019-01-17 PROCEDURE — C1785: CPT

## 2019-01-17 PROCEDURE — 71046 X-RAY EXAM CHEST 2 VIEWS: CPT

## 2019-01-17 PROCEDURE — 71046 X-RAY EXAM CHEST 2 VIEWS: CPT | Mod: 26

## 2019-01-17 PROCEDURE — C1898: CPT

## 2019-01-17 PROCEDURE — C1894: CPT

## 2019-01-17 PROCEDURE — 90662 IIV NO PRSV INCREASED AG IM: CPT

## 2019-01-17 RX ORDER — ACETAMINOPHEN 500 MG
2 TABLET ORAL
Qty: 0 | Refills: 0 | COMMUNITY
Start: 2019-01-17

## 2019-01-17 RX ORDER — INFLUENZA VIRUS VACCINE 15; 15; 15; 15 UG/.5ML; UG/.5ML; UG/.5ML; UG/.5ML
0.5 SUSPENSION INTRAMUSCULAR ONCE
Qty: 0 | Refills: 0 | Status: COMPLETED | OUTPATIENT
Start: 2019-01-17 | End: 2019-01-17

## 2019-01-17 RX ADMIN — Medication 650 MILLIGRAM(S): at 10:44

## 2019-01-17 RX ADMIN — INFLUENZA VIRUS VACCINE 0.5 MILLILITER(S): 15; 15; 15; 15 SUSPENSION INTRAMUSCULAR at 12:40

## 2019-01-17 RX ADMIN — Medication 650 MILLIGRAM(S): at 13:00

## 2019-01-17 RX ADMIN — ISOSORBIDE MONONITRATE 30 MILLIGRAM(S): 60 TABLET, EXTENDED RELEASE ORAL at 10:42

## 2019-01-17 RX ADMIN — Medication 650 MILLIGRAM(S): at 05:32

## 2019-01-17 RX ADMIN — Medication 650 MILLIGRAM(S): at 06:37

## 2019-01-17 RX ADMIN — Medication 100 MILLIGRAM(S): at 05:32

## 2019-01-17 RX ADMIN — LOSARTAN POTASSIUM 50 MILLIGRAM(S): 100 TABLET, FILM COATED ORAL at 05:32
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
## 2019-01-17 NOTE — DISCHARGE NOTE ADULT - PATIENT PORTAL LINK FT
You can access the [facility], offered by [facility], by registering with the following website: [email or web address]

## 2019-01-17 NOTE — DISCHARGE NOTE ADULT - MEDICATION SUMMARY - MEDICATIONS TO TAKE
I will START or STAY ON the medications listed below when I get home from the hospital:    Ecotrin Adult Low Strength 81 mg oral delayed release tablet  -- 1 tab(s) by mouth once a day  -- Indication: For CAD    acetaminophen 325 mg oral tablet  -- 2 tab(s) by mouth every 6 hours, As needed, Moderate Pain (4 - 6)  -- Indication: For Post-procedure pain    isosorbide mononitrate 30 mg oral tablet, extended release  -- 1 tab(s) by mouth once a day (in the morning)  -- Indication: For CAD    atorvastatin 40 mg oral tablet  -- 1 tab(s) by mouth once a day  -- Indication: For Hyperlipidemia    losartan-hydrochlorothiazide 50mg-12.5mg oral tablet  -- 1 tab(s) by mouth once a day  -- Indication: For HTN    Bovey Thyroid 60 mg oral tablet  -- 1 tab(s) by mouth once a day  -- Indication: For Hypothyroid I will START or STAY ON the medications listed below when I get home from the hospital:    Ecotrin Adult Low Strength 81 mg oral delayed release tablet  -- 1 tab(s) by mouth once a day  -- Indication: For CAD    acetaminophen 325 mg oral tablet  -- 2 tab(s) by mouth every 6 hours, As needed, Moderate Pain (4 - 6)  -- Indication: For Post-procedure pain    isosorbide mononitrate 30 mg oral tablet, extended release  -- 1 tab(s) by mouth once a day (in the morning)  -- Indication: For CAD    atorvastatin 40 mg oral tablet  -- 1 tab(s) by mouth once a day  -- Indication: For Hyperlipidemia    losartan-hydrochlorothiazide 50mg-12.5mg oral tablet  -- 1 tab(s) by mouth once a day  -- Indication: For HTN    Ashburn Thyroid 60 mg oral tablet  -- 1 tab(s) by mouth once a day  -- Indication: For Hypothyroid

## 2019-01-17 NOTE — PROCEDURE NOTE - ADDITIONAL PROCEDURE DETAILS
Normal post-implant device check. Incision healing well, Dermabond in place, no bleeding or hematoma. CXR shows good lead placement and no pneumothorax.

## 2019-01-17 NOTE — DISCHARGE NOTE ADULT - CARE PLAN
Principal Discharge DX:	Bradycardia  Goal:	s/p PPM implant Principal Discharge DX:	Bradycardia  Goal:	s/p PPM implant  Assessment and plan of treatment:	You had a pacemaker placed to prevent symptomatic slow heart beats. It is very important that you allow one month for the leads in the heart to fully heal. During this time, please avoid lifting the left arm above the level of the heart, please also avoid lifting anything heavier than 5lbs with the left arm. You may shower, but do not scrub the incision or tub bath for one month.     You may not have an MRI for six weeks.     Please see Dr. Jimenez in the office on Feb 4th at 9am.  Secondary Diagnosis:	Coronary artery disease involving native coronary artery of native heart, angina presence unspecified  Goal:	Avoid obstruction  Assessment and plan of treatment:	Continue isosorbide.  Secondary Diagnosis:	Pure hypercholesterolemia  Goal:	Normal cholesterol levels  Assessment and plan of treatment:	Continue atorvastatin.  Secondary Diagnosis:	Hypothyroidism  Goal:	Euthyroid  Assessment and plan of treatment:	Continue armor thyroid.  Secondary Diagnosis:	Essential hypertension  Goal:	Controlled BP  Assessment and plan of treatment:	Continue losartan-hcthz.

## 2019-01-17 NOTE — DISCHARGE NOTE ADULT - SECONDARY DIAGNOSIS.
Coronary artery disease involving native coronary artery of native heart, angina presence unspecified Pure hypercholesterolemia Hypothyroidism Essential hypertension

## 2019-01-17 NOTE — DISCHARGE NOTE ADULT - PLAN OF CARE
s/p PPM implant You had a pacemaker placed to prevent symptomatic slow heart beats. It is very important that you allow one month for the leads in the heart to fully heal. During this time, please avoid lifting the left arm above the level of the heart, please also avoid lifting anything heavier than 5lbs with the left arm. You may shower, but do not scrub the incision or tub bath for one month.     You may not have an MRI for six weeks.     Please see Dr. Jimenez in the office on Feb 4th at 9am. Avoid obstruction Continue isosorbide. Normal cholesterol levels Continue atorvastatin. Euthyroid Continue armor thyroid. Controlled BP Continue losartan-hcthz.

## 2019-01-17 NOTE — DISCHARGE NOTE ADULT - CARE PROVIDER_API CALL
Gabrielle Jimenez), Cardiac Electrophysiology; Cardiovascular Disease; Internal Medicine  [address] [place] [address]  Phone: [phone]  Fax: [phone]

## 2019-01-18 ENCOUNTER — RX RENEWAL (OUTPATIENT)
Age: 74
End: 2019-01-18

## 2019-01-18 DIAGNOSIS — I10 ESSENTIAL (PRIMARY) HYPERTENSION: ICD-10-CM

## 2019-01-18 RX ORDER — ISOSORBIDE MONONITRATE 30 MG/1
30 TABLET, EXTENDED RELEASE ORAL DAILY
Qty: 30 | Refills: 0 | Status: ACTIVE | COMMUNITY
Start: 1900-01-01 | End: 1900-01-01

## 2019-02-04 ENCOUNTER — APPOINTMENT (OUTPATIENT)
Dept: HEART AND VASCULAR | Facility: CLINIC | Age: 74
End: 2019-02-04
Payer: MEDICARE

## 2019-02-04 VITALS
WEIGHT: 180 LBS | HEART RATE: 82 BPM | HEIGHT: 63 IN | SYSTOLIC BLOOD PRESSURE: 106 MMHG | DIASTOLIC BLOOD PRESSURE: 57 MMHG | BODY MASS INDEX: 31.89 KG/M2

## 2019-02-04 PROBLEM — R00.1 BRADYCARDIA, UNSPECIFIED: Chronic | Status: ACTIVE | Noted: 2019-01-16

## 2019-02-04 PROCEDURE — 99024 POSTOP FOLLOW-UP VISIT: CPT

## 2019-02-04 PROCEDURE — 93280 PM DEVICE PROGR EVAL DUAL: CPT

## 2019-02-04 RX ORDER — LOSARTAN POTASSIUM AND HYDROCHLOROTHIAZIDE 100; 12.5 MG/1; MG/1
100-12.5 TABLET, FILM COATED ORAL
Refills: 0 | Status: ACTIVE | COMMUNITY

## 2019-02-04 RX ORDER — LOSARTAN POTASSIUM 50 MG/1
50 TABLET, FILM COATED ORAL
Refills: 0 | Status: DISCONTINUED | COMMUNITY
End: 2019-02-04

## 2019-02-06 NOTE — REVIEW OF SYSTEMS
[Shortness Of Breath] : shortness of breath [Dyspnea on exertion] : dyspnea during exertion [see HPI] : see HPI [Negative] : Heme/Lymph

## 2019-02-06 NOTE — HISTORY OF PRESENT ILLNESS
[FreeTextEntry1] : Mr. Clyde Duque is a 74 yo Female, primarily resides in [place], former smoker (quit 10yrs ago), with PMH of HTN, Hyperlipidemia, GERD, Hypothyroidism and sinus node dysfunction with associated dizziness and syncope.  Sheis s/p PPM placement 1/16/19 and presents for post procedure follow-up today.  She offers no device related complaints and denies any recurrent presyncope or syncope.  \par \par Stress Echo 04/06/18: Normal study demonstrating normal LV wall motion response to exercise. LVEF of 70%.  \par \par Nuclear Stress 05/07/18: Mild anterior ischemia, LVEF > 70%.

## 2019-02-06 NOTE — REASON FOR VISIT
[Follow-Up - Clinic] : a clinic follow-up of [Abnormal ECG] : an abnormal ECG [Abnormal Test Result] : an abnormal test result [Follow-up Device Check] : follow-up device check visit

## 2019-02-06 NOTE — PHYSICAL EXAM
[General Appearance - Well Developed] : well developed [Normal Appearance] : normal appearance [Well Groomed] : well groomed [General Appearance - Well Nourished] : well nourished [No Deformities] : no deformities [General Appearance - In No Acute Distress] : no acute distress [Heart Rate And Rhythm] : heart rate and rhythm were normal [Heart Sounds] : normal S1 and S2 [Murmurs] : no murmurs present [Respiration, Rhythm And Depth] : normal respiratory rhythm and effort [Exaggerated Use Of Accessory Muscles For Inspiration] : no accessory muscle use [Auscultation Breath Sounds / Voice Sounds] : lungs were clear to auscultation bilaterally [Abdomen Soft] : soft [Abdomen Tenderness] : non-tender [Abdomen Mass (___ Cm)] : no abdominal mass palpated [Nail Clubbing] : no clubbing of the fingernails [Cyanosis, Localized] : no localized cyanosis [Petechial Hemorrhages (___cm)] : no petechial hemorrhages [Normal Conjunctiva] : the conjunctiva exhibited no abnormalities [Eyelids - No Xanthelasma] : the eyelids demonstrated no xanthelasmas [Normal Oral Mucosa] : normal oral mucosa [No Oral Pallor] : no oral pallor [No Oral Cyanosis] : no oral cyanosis [Normal Jugular Venous A Waves Present] : normal jugular venous A waves present [Normal Jugular Venous V Waves Present] : normal jugular venous V waves present [No Jugular Venous Wynne A Waves] : no jugular venous wynne A waves [Abnormal Walk] : normal gait [Gait - Sufficient For Exercise Testing] : the gait was sufficient for exercise testing [Skin Color & Pigmentation] : normal skin color and pigmentation [] : no rash [No Venous Stasis] : no venous stasis [Skin Lesions] : no skin lesions [No Skin Ulcers] : no skin ulcer [No Xanthoma] : no  xanthoma was observed [Oriented To Time, Place, And Person] : oriented to person, place, and time [Affect] : the affect was normal [Mood] : the mood was normal [No Anxiety] : not feeling anxious [Left Infraclavicular] : left infraclavicular area [Clean] : clean [Dry] : dry [Well-Healed] : well-healed

## 2019-02-06 NOTE — PROCEDURE
[No] : not [Sinus Bradycardia] : sinus bradycardia [Pacemaker] : pacemaker [DDD] : DDD [Normal] : The battery status is normal. [Lead Imp:  ___ohms] : lead impedance was [unfilled] ohms [Sensing Amplitude ___mv] : sensing amplitude was [unfilled] mv [___V @] : [unfilled] V [___ ms] : [unfilled] ms [de-identified] : [employer]  [de-identified] : Nigel [de-identified] : 1/16/19 [de-identified] : 62

## 2019-02-06 NOTE — DISCUSSION/SUMMARY
[FreeTextEntry1] : Ms. Tang is a 74 yo Female with symptomatic sinus node dysfunction s/p PPM placement 1/2019.  Her device is functioning appropriately as programmed and all measured data is WNL.  Her incision is healing well.  She is enrolled in remote monitoring and was asked to return for follow-up in six months.  Advised to call with any questions or concerns in the interim.

## 2019-06-24 ENCOUNTER — APPOINTMENT (OUTPATIENT)
Dept: HEART AND VASCULAR | Facility: CLINIC | Age: 74
End: 2019-06-24
Payer: MEDICARE

## 2019-06-24 VITALS
HEIGHT: 63 IN | DIASTOLIC BLOOD PRESSURE: 60 MMHG | SYSTOLIC BLOOD PRESSURE: 131 MMHG | BODY MASS INDEX: 32.78 KG/M2 | HEART RATE: 60 BPM | WEIGHT: 185 LBS

## 2019-06-24 PROCEDURE — 93280 PM DEVICE PROGR EVAL DUAL: CPT

## 2019-06-24 NOTE — PHYSICAL EXAM
[General Appearance - Well Developed] : well developed [Normal Appearance] : normal appearance [Well Groomed] : well groomed [General Appearance - Well Nourished] : well nourished [No Deformities] : no deformities [General Appearance - In No Acute Distress] : no acute distress [Heart Rate And Rhythm] : heart rate and rhythm were normal [Heart Sounds] : normal S1 and S2 [Murmurs] : no murmurs present [Respiration, Rhythm And Depth] : normal respiratory rhythm and effort [Exaggerated Use Of Accessory Muscles For Inspiration] : no accessory muscle use [Auscultation Breath Sounds / Voice Sounds] : lungs were clear to auscultation bilaterally [Left Infraclavicular] : left infraclavicular area [Clean] : clean [Dry] : dry [Well-Healed] : well-healed [Abdomen Soft] : soft [Abdomen Tenderness] : non-tender [Abdomen Mass (___ Cm)] : no abdominal mass palpated [Nail Clubbing] : no clubbing of the fingernails [Cyanosis, Localized] : no localized cyanosis [Petechial Hemorrhages (___cm)] : no petechial hemorrhages [Normal Conjunctiva] : the conjunctiva exhibited no abnormalities [Eyelids - No Xanthelasma] : the eyelids demonstrated no xanthelasmas [Normal Oral Mucosa] : normal oral mucosa [No Oral Pallor] : no oral pallor [No Oral Cyanosis] : no oral cyanosis [Normal Jugular Venous A Waves Present] : normal jugular venous A waves present [No Jugular Venous Wynne A Waves] : no jugular venous wynne A waves [Normal Jugular Venous V Waves Present] : normal jugular venous V waves present [Abnormal Walk] : normal gait [Gait - Sufficient For Exercise Testing] : the gait was sufficient for exercise testing [Skin Color & Pigmentation] : normal skin color and pigmentation [No Venous Stasis] : no venous stasis [] : no rash [Skin Lesions] : no skin lesions [No Skin Ulcers] : no skin ulcer [No Xanthoma] : no  xanthoma was observed [Oriented To Time, Place, And Person] : oriented to person, place, and time [Affect] : the affect was normal [Mood] : the mood was normal [No Anxiety] : not feeling anxious

## 2019-06-27 NOTE — HISTORY OF PRESENT ILLNESS
[FreeTextEntry1] : Mr. Clyde Duque is a 74 yo Female, primarily resides in [place], former smoker (quit 10yrs ago), with PMH of HTN, Hyperlipidemia, GERD, Hypothyroidism and sinus node dysfunction with associated dizziness and syncope.  She is s/p PPM placement 1/16/19.  She presents for routine follow-up and offers no acute complaints.  She offers no device related complaints and denies any recurrent presyncope or syncope.  \par \par Stress Echo 04/06/18: Normal study demonstrating normal LV wall motion response to exercise. LVEF of 70%.  \par \par Nuclear Stress 05/07/18: Mild anterior ischemia, LVEF > 70%.

## 2019-06-27 NOTE — DISCUSSION/SUMMARY
[FreeTextEntry1] : Ms. Tang is a 74 yo Female with symptomatic sinus node dysfunction s/p PPM placement 1/2019.  Her device is functioning appropriately as programmed and all measured data is WNL.  Her incision is healing well.  She is enrolled in remote monitoring and was asked to return for follow-up prior to returning to [place] in the Fall.  Advised to call with any questions or concerns in the interim.

## 2019-06-27 NOTE — PROCEDURE
[No] : not [Pacemaker] : pacemaker [DDD] : DDD [Normal] : The battery status is normal. [Lead Imp:  ___ohms] : lead impedance was [unfilled] ohms [Sensing Amplitude ___mv] : sensing amplitude was [unfilled] mv [___V @] : [unfilled] V [___ ms] : [unfilled] ms [Sinoatrial Exit Block] : sinoatrial exit block [de-identified] : [employer]  [de-identified] : Nigel [de-identified] : 1/16/19 [de-identified] : 62 [de-identified] : 9 y 3 mo  [de-identified] : No events

## 2019-12-09 ENCOUNTER — APPOINTMENT (OUTPATIENT)
Dept: HEART AND VASCULAR | Facility: CLINIC | Age: 74
End: 2019-12-09
Payer: MEDICARE

## 2019-12-09 VITALS
WEIGHT: 185 LBS | HEIGHT: 63 IN | BODY MASS INDEX: 32.78 KG/M2 | HEART RATE: 62 BPM | SYSTOLIC BLOOD PRESSURE: 136 MMHG | DIASTOLIC BLOOD PRESSURE: 63 MMHG

## 2019-12-09 PROCEDURE — 99213 OFFICE O/P EST LOW 20 MIN: CPT | Mod: 25

## 2019-12-09 PROCEDURE — 93280 PM DEVICE PROGR EVAL DUAL: CPT

## 2019-12-09 NOTE — DISCUSSION/SUMMARY
[FreeTextEntry1] : Ms. Tang is a 75 yo Female with symptomatic sinus node dysfunction s/p PPM placement 1/2019.  Her device is functioning appropriately as programmed and all measured data is WNL. No arrhythmia events for review.  She is enrolled in remote monitoring and was asked to return for follow-up in six months.  Advised to call with any questions or concerns in the interim.

## 2019-12-09 NOTE — REVIEW OF SYSTEMS
[Dyspnea on exertion] : dyspnea during exertion [Shortness Of Breath] : shortness of breath [see HPI] : see HPI [Negative] : Endocrine

## 2019-12-09 NOTE — PROCEDURE
[Sinoatrial Exit Block] : sinoatrial exit block [No] : not [Normal] : The battery status is normal. [Pacemaker] : pacemaker [___V @] : [unfilled] V [Sensing Amplitude ___mv] : sensing amplitude was [unfilled] mv [Lead Imp:  ___ohms] : lead impedance was [unfilled] ohms [___ ms] : [unfilled] ms [de-identified] : [employer]  [de-identified] : Nigel [de-identified] : 1/16/19 [de-identified] : DDDR ADIR [de-identified] : 62 [de-identified] : 9 y  [de-identified] : No events\par AP 96\par  10

## 2019-12-09 NOTE — PHYSICAL EXAM
[General Appearance - Well Developed] : well developed [Normal Appearance] : normal appearance [Well Groomed] : well groomed [No Deformities] : no deformities [General Appearance - Well Nourished] : well nourished [General Appearance - In No Acute Distress] : no acute distress [Heart Sounds] : normal S1 and S2 [Heart Rate And Rhythm] : heart rate and rhythm were normal [Murmurs] : no murmurs present [Auscultation Breath Sounds / Voice Sounds] : lungs were clear to auscultation bilaterally [Respiration, Rhythm And Depth] : normal respiratory rhythm and effort [Exaggerated Use Of Accessory Muscles For Inspiration] : no accessory muscle use [Clean] : clean [Left Infraclavicular] : left infraclavicular area [Dry] : dry [Abdomen Tenderness] : non-tender [Abdomen Soft] : soft [Well-Healed] : well-healed [Cyanosis, Localized] : no localized cyanosis [Nail Clubbing] : no clubbing of the fingernails [Abdomen Mass (___ Cm)] : no abdominal mass palpated [Petechial Hemorrhages (___cm)] : no petechial hemorrhages [Normal Conjunctiva] : the conjunctiva exhibited no abnormalities [Normal Oral Mucosa] : normal oral mucosa [No Oral Pallor] : no oral pallor [Eyelids - No Xanthelasma] : the eyelids demonstrated no xanthelasmas [Normal Jugular Venous V Waves Present] : normal jugular venous V waves present [No Oral Cyanosis] : no oral cyanosis [Normal Jugular Venous A Waves Present] : normal jugular venous A waves present [No Jugular Venous Wynne A Waves] : no jugular venous wynne A waves [Abnormal Walk] : normal gait [Gait - Sufficient For Exercise Testing] : the gait was sufficient for exercise testing [Skin Color & Pigmentation] : normal skin color and pigmentation [] : no rash [Skin Lesions] : no skin lesions [No Venous Stasis] : no venous stasis [No Skin Ulcers] : no skin ulcer [No Xanthoma] : no  xanthoma was observed [Oriented To Time, Place, And Person] : oriented to person, place, and time [Affect] : the affect was normal [No Anxiety] : not feeling anxious [Mood] : the mood was normal

## 2019-12-09 NOTE — REASON FOR VISIT
[Follow-Up - Clinic] : a clinic follow-up of [Follow-up Device Check] : follow-up device check visit [Abnormal ECG] : an abnormal ECG [Abnormal Test Result] : an abnormal test result

## 2019-12-09 NOTE — HISTORY OF PRESENT ILLNESS
[FreeTextEntry1] : Mr. Clyde Duque is a 75 yo Female, primarily resides in [place], former smoker (quit 10yrs ago), with PMH of HTN, Hyperlipidemia, GERD, Hypothyroidism and sinus node dysfunction with associated dizziness and syncope.  She is s/p PPM placement 1/16/19.  She presents for routine follow-up and offers no device related complaints.  Denies any recurrent presyncope or syncope.  Plan to return to [place] in January.  Suffering from URI.\par \par Stress Echo 04/06/18: Normal study demonstrating normal LV wall motion response to exercise. LVEF of 70%.  \par \par Nuclear Stress 05/07/18: Mild anterior ischemia, LVEF > 70%.

## 2020-04-29 ENCOUNTER — APPOINTMENT (OUTPATIENT)
Dept: HEART AND VASCULAR | Facility: CLINIC | Age: 75
End: 2020-04-29

## 2020-07-29 ENCOUNTER — APPOINTMENT (OUTPATIENT)
Dept: HEART AND VASCULAR | Facility: CLINIC | Age: 75
End: 2020-07-29

## 2020-08-24 ENCOUNTER — APPOINTMENT (OUTPATIENT)
Dept: HEART AND VASCULAR | Facility: CLINIC | Age: 75
End: 2020-08-24

## 2020-08-31 ENCOUNTER — APPOINTMENT (OUTPATIENT)
Dept: HEART AND VASCULAR | Facility: CLINIC | Age: 75
End: 2020-08-31
Payer: MEDICARE

## 2020-08-31 VITALS — BODY MASS INDEX: 32.78 KG/M2 | WEIGHT: 185 LBS | HEIGHT: 63 IN

## 2020-08-31 VITALS — HEART RATE: 60 BPM | DIASTOLIC BLOOD PRESSURE: 65 MMHG | OXYGEN SATURATION: 100 % | SYSTOLIC BLOOD PRESSURE: 108 MMHG

## 2020-08-31 PROCEDURE — 93280 PM DEVICE PROGR EVAL DUAL: CPT

## 2020-08-31 PROCEDURE — 99213 OFFICE O/P EST LOW 20 MIN: CPT | Mod: 25

## 2020-08-31 NOTE — PHYSICAL EXAM
[General Appearance - Well Developed] : well developed [Normal Appearance] : normal appearance [Well Groomed] : well groomed [General Appearance - Well Nourished] : well nourished [No Deformities] : no deformities [General Appearance - In No Acute Distress] : no acute distress [Heart Rate And Rhythm] : heart rate and rhythm were normal [Murmurs] : no murmurs present [Heart Sounds] : normal S1 and S2 [Respiration, Rhythm And Depth] : normal respiratory rhythm and effort [Exaggerated Use Of Accessory Muscles For Inspiration] : no accessory muscle use [Auscultation Breath Sounds / Voice Sounds] : lungs were clear to auscultation bilaterally [Left Infraclavicular] : left infraclavicular area [Clean] : clean [Dry] : dry [Well-Healed] : well-healed [Abdomen Tenderness] : non-tender [Abdomen Soft] : soft [Abdomen Mass (___ Cm)] : no abdominal mass palpated [Nail Clubbing] : no clubbing of the fingernails [Cyanosis, Localized] : no localized cyanosis [Petechial Hemorrhages (___cm)] : no petechial hemorrhages [] : no ischemic changes

## 2020-08-31 NOTE — REASON FOR VISIT
[Follow-up Device Check] : follow-up device check visit [Follow-Up - Clinic] : a clinic follow-up of [Abnormal ECG] : an abnormal ECG [Abnormal Test Result] : an abnormal test result

## 2020-09-08 NOTE — HISTORY OF PRESENT ILLNESS
[FreeTextEntry1] : Mr. Clyde Duque is a 76 yo Female, primarily resides in [place], former smoker (quit 10yrs ago), with PMH of HTN, Hyperlipidemia, GERD, Hypothyroidism, non-obstructive CAD (cath in 2018) and sinus node dysfunction with associated dizziness and syncope.  She is s/p PPM placement 1/16/19.  She presents for routine follow-up and offers no device related complaints.  She does notice BEJARANO for past 5 weeks. Going back to [place] next month.  Got Covid antibody positive.  \par \par

## 2020-09-08 NOTE — PROCEDURE
[No] : not [Sinoatrial Exit Block] : sinoatrial exit block [Pacemaker] : pacemaker [Normal] : The battery status is normal. [Lead Imp:  ___ohms] : lead impedance was [unfilled] ohms [Sensing Amplitude ___mv] : sensing amplitude was [unfilled] mv [___V @] : [unfilled] V [___ ms] : [unfilled] ms [None] : none [de-identified] : [employer]  [de-identified] : Nigel [de-identified] : [id number] [de-identified] : 1/16/19 [de-identified] : DDDR ADIR [de-identified] : 62 [de-identified] : 8.7 y  [de-identified] : No events\par AP 96\par  11

## 2020-09-08 NOTE — DISCUSSION/SUMMARY
[FreeTextEntry1] : Ms. Tang is a 76 yo Female with symptomatic sinus node dysfunction s/p PPM placement 1/2019.  Her device is functioning appropriately as programmed and all measured data is WNL. Given BEJARANO, advise pt to see Dr. Rodriguez tomorrow to have Echo.  We also added CLS to her pacing mode (DDD-CLS) to see if that would help with her symptoms. She can f/u with us in 6 months.

## 2020-09-17 ENCOUNTER — APPOINTMENT (OUTPATIENT)
Dept: HEART AND VASCULAR | Facility: CLINIC | Age: 75
End: 2020-09-17

## 2020-10-29 ENCOUNTER — APPOINTMENT (OUTPATIENT)
Dept: HEART AND VASCULAR | Facility: CLINIC | Age: 75
End: 2020-10-29
Payer: MEDICARE

## 2020-10-29 PROCEDURE — G2066: CPT

## 2020-10-29 PROCEDURE — 93296 REM INTERROG EVL PM/IDS: CPT

## 2020-10-29 PROCEDURE — 93294 REM INTERROG EVL PM/LDLS PM: CPT

## 2021-01-27 ENCOUNTER — APPOINTMENT (OUTPATIENT)
Dept: HEART AND VASCULAR | Facility: CLINIC | Age: 76
End: 2021-01-27

## 2021-02-22 ENCOUNTER — APPOINTMENT (OUTPATIENT)
Dept: HEART AND VASCULAR | Facility: CLINIC | Age: 76
End: 2021-02-22

## 2021-04-05 ENCOUNTER — APPOINTMENT (OUTPATIENT)
Dept: HEART AND VASCULAR | Facility: CLINIC | Age: 76
End: 2021-04-05
Payer: MEDICARE

## 2021-04-05 VITALS — DIASTOLIC BLOOD PRESSURE: 63 MMHG | HEART RATE: 66 BPM | SYSTOLIC BLOOD PRESSURE: 144 MMHG

## 2021-04-05 PROCEDURE — 93280 PM DEVICE PROGR EVAL DUAL: CPT

## 2021-04-05 RX ORDER — THYROID, PORCINE 60 MG/1
60 TABLET ORAL
Refills: 0 | Status: DISCONTINUED | COMMUNITY
End: 2021-04-05

## 2021-04-05 RX ORDER — LEVOTHYROXINE SODIUM 137 UG/1
TABLET ORAL
Refills: 0 | Status: ACTIVE | COMMUNITY

## 2021-04-05 NOTE — PROCEDURE
[No] : not [Pacemaker] : pacemaker [Normal] : The battery status is normal. [Lead Imp:  ___ohms] : lead impedance was [unfilled] ohms [Sensing Amplitude ___mv] : sensing amplitude was [unfilled] mv [___V @] : [unfilled] V [___ ms] : [unfilled] ms [None] : none [Sinus Bradycardia] : sinus bradycardia [de-identified] : [employer]  [de-identified] : Nigel [de-identified] : [id number] [de-identified] : 1/16/19 [de-identified] : DDDR ADIR [de-identified] : 62 [de-identified] : 7 y 6 mo [de-identified] : No events\par AP 97\par  5

## 2021-04-05 NOTE — DISCUSSION/SUMMARY
[FreeTextEntry1] : Ms. Tang is a 76 yo Female with symptomatic sinus node dysfunction s/p PPM placement 1/2019.  Her device is functioning appropriately as programmed and all measured data is WNL. Advised to continue ischemia evaluation with .  No programming changes made today. She can f/u with us in 6 months.

## 2021-04-05 NOTE — HISTORY OF PRESENT ILLNESS
[FreeTextEntry1] : Mr. Clyde Duque is a 76 yo Female, primarily resides in [place], former smoker (quit 10yrs ago), with PMH of HTN, Hyperlipidemia, GERD, Hypothyroidism, non-obstructive CAD (cath in 2018) and sinus node dysfunction with associated dizziness and syncope.  She is s/p PPM placement 1/16/19.  She presents for routine follow-up and offers no device related complaints.  Reports BEJARANO has been getting a bit worse.  Evaluated by Dr. Rodriguez; appears to have had abnormal stress test and is to be scheduled for cardiac cath.

## 2021-04-21 ENCOUNTER — APPOINTMENT (OUTPATIENT)
Dept: CT IMAGING | Facility: HOSPITAL | Age: 76
End: 2021-04-21

## 2021-04-21 ENCOUNTER — OUTPATIENT (OUTPATIENT)
Dept: OUTPATIENT SERVICES | Facility: HOSPITAL | Age: 76
LOS: 1 days | End: 2021-04-21
Payer: MEDICARE

## 2021-04-21 DIAGNOSIS — Z96.642 PRESENCE OF LEFT ARTIFICIAL HIP JOINT: Chronic | ICD-10-CM

## 2021-04-21 DIAGNOSIS — Z90.49 ACQUIRED ABSENCE OF OTHER SPECIFIED PARTS OF DIGESTIVE TRACT: Chronic | ICD-10-CM

## 2021-04-21 DIAGNOSIS — Z98.49 CATARACT EXTRACTION STATUS, UNSPECIFIED EYE: Chronic | ICD-10-CM

## 2021-04-21 DIAGNOSIS — Z96.641 PRESENCE OF RIGHT ARTIFICIAL HIP JOINT: Chronic | ICD-10-CM

## 2021-04-21 PROCEDURE — 75574 CT ANGIO HRT W/3D IMAGE: CPT | Mod: 26,MH

## 2021-04-21 PROCEDURE — 75574 CT ANGIO HRT W/3D IMAGE: CPT
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
## 2021-10-04 ENCOUNTER — APPOINTMENT (OUTPATIENT)
Dept: HEART AND VASCULAR | Facility: CLINIC | Age: 76
End: 2021-10-04

## 2021-10-06 PROBLEM — I10 ESSENTIAL HYPERTENSION: Status: ACTIVE | Noted: 2019-01-18

## 2022-05-09 ENCOUNTER — APPOINTMENT (OUTPATIENT)
Dept: HEART AND VASCULAR | Facility: CLINIC | Age: 77
End: 2022-05-09
Payer: MEDICARE

## 2022-05-09 VITALS
TEMPERATURE: 97 F | SYSTOLIC BLOOD PRESSURE: 111 MMHG | DIASTOLIC BLOOD PRESSURE: 75 MMHG | HEART RATE: 89 BPM | WEIGHT: 190 LBS | BODY MASS INDEX: 33.66 KG/M2 | HEIGHT: 63 IN

## 2022-05-09 PROCEDURE — 93280 PM DEVICE PROGR EVAL DUAL: CPT

## 2022-05-09 PROCEDURE — 99213 OFFICE O/P EST LOW 20 MIN: CPT

## 2022-05-09 NOTE — REASON FOR VISIT
[Follow-up Device Check] : is here today for a follow-up device check visit for [Follow-Up - Clinic] : a clinic follow-up of [Abnormal ECG] : an abnormal ECG [Abnormal Test Result] : an abnormal test result

## 2022-05-09 NOTE — PROCEDURE
[No] : not [Sinus Bradycardia] : sinus bradycardia [Pacemaker] : pacemaker [Normal] : The battery status is normal. [Lead Imp:  ___ohms] : lead impedance was [unfilled] ohms [Sensing Amplitude ___mv] : sensing amplitude was [unfilled] mv [___V @] : [unfilled] V [___ ms] : [unfilled] ms [de-identified] : [employer]  [de-identified] : Nigel [de-identified] : [id number] [de-identified] : 1/16/19 [de-identified] : DDDR ADIR [de-identified] : 62 [de-identified] : 6 y 10 mo [de-identified] : No events\par AP 98\par  5\par \par Changed AV delays to dynamic 185-225  to allow for more physiological AV delays

## 2022-05-09 NOTE — HISTORY OF PRESENT ILLNESS
[FreeTextEntry1] : Mr. Clyde Duque is a 76 yo Female, primarily resides in [place], former smoker (quit 10yrs ago), with PMH of HTN, Hyperlipidemia, GERD, Hypothyroidism, non-obstructive CAD (cath in 2018) and sinus node dysfunction with associated dizziness and syncope.  She is s/p PPM placement 1/16/19.  She presents for routine follow-up and offers no device related complaints.  She reports BEJARANO that has been getting a bit worse over the last 6 months. She can not walk 1 block without stopping once or twice to catch her breath. She was evaluated by a pulmonologist and was told her Asthma is stable. She is now following with Dr. Simpson from cardiology and will see him this month and might get stress test/Cath.\par \par She has been adherent to her medications

## 2022-09-12 NOTE — DISCHARGE NOTE ADULT - NS DC ANGIO PCI YN
*Medicare refill - please check units and qty*  *Need Rx by: Today, Sept 13th by 3 pm    *Last comp visit: 11/2/2021    *Drug: Kogenate at 4 doses of 3322 units    *Refills left: 0    Thanks,    MICHAEL Cameron, [facility]  Lead Pharmacy Coordinator  [facility]  [phone]    
no

## 2022-11-11 ENCOUNTER — NON-APPOINTMENT (OUTPATIENT)
Age: 77
End: 2022-11-11

## 2022-11-14 ENCOUNTER — APPOINTMENT (OUTPATIENT)
Dept: HEART AND VASCULAR | Facility: CLINIC | Age: 77
End: 2022-11-14

## 2022-11-14 VITALS
WEIGHT: 194 LBS | HEIGHT: 63 IN | DIASTOLIC BLOOD PRESSURE: 60 MMHG | HEART RATE: 67 BPM | BODY MASS INDEX: 34.38 KG/M2 | SYSTOLIC BLOOD PRESSURE: 137 MMHG

## 2022-11-14 PROCEDURE — 93280 PM DEVICE PROGR EVAL DUAL: CPT

## 2022-11-22 NOTE — PHYSICAL EXAM
[General Appearance - Well Developed] : well developed [Normal Appearance] : normal appearance [Well Groomed] : well groomed [No Deformities] : no deformities [General Appearance - Well Nourished] : well nourished [General Appearance - In No Acute Distress] : no acute distress [Heart Rate And Rhythm] : heart rate and rhythm were normal [Heart Sounds] : normal S1 and S2 [Murmurs] : no murmurs present [Respiration, Rhythm And Depth] : normal respiratory rhythm and effort [Exaggerated Use Of Accessory Muscles For Inspiration] : no accessory muscle use [Auscultation Breath Sounds / Voice Sounds] : lungs were clear to auscultation bilaterally [Left Infraclavicular] : left infraclavicular area [Clean] : clean [Dry] : dry [Well-Healed] : well-healed [Abdomen Soft] : soft [Abdomen Tenderness] : non-tender [Abdomen Mass (___ Cm)] : no abdominal mass palpated [Nail Clubbing] : no clubbing of the fingernails [Cyanosis, Localized] : no localized cyanosis [Petechial Hemorrhages (___cm)] : no petechial hemorrhages [] : no ischemic changes

## 2022-11-22 NOTE — HISTORY OF PRESENT ILLNESS
[FreeTextEntry1] : Mr. Clyde Duque is a 76 yo Female, primarily resides in [place], former smoker (quit 10yrs ago), with PMH of HTN, Hyperlipidemia, GERD, Hypothyroidism, non-obstructive CAD (cath in 2018) and sinus node dysfunction with associated dizziness and syncope.  She is s/p PPM placement 1/16/19.  Changed AV delays to dynamic 185-225  to allow for more physiological AV delays.  \par \par She presents for routine follow-up and offers no device related complaints.  She was evaluated by a pulmonologist and was told her Asthma is stable. She is now following with Dr. Charles Purvis from cardiology.\par \par She has been adherent to her medication regimen.

## 2022-11-22 NOTE — DISCUSSION/SUMMARY
[FreeTextEntry1] : Ms. Tang is a 76 yo Female with symptomatic sinus node dysfunction s/p PPM placement 1/2019.  Her device is functioning appropriately as programmed and all measured data is WNL.\par \par # s/p PPM: stable with excellent parameters.\par \par # she will follow up with her cardiologist Dr. Petty\par \par She can f/u with us in 6 months.

## 2022-11-22 NOTE — PROCEDURE
[No] : not [Sinus Bradycardia] : sinus bradycardia [Pacemaker] : pacemaker [Normal] : The battery status is normal. [Lead Imp:  ___ohms] : lead impedance was [unfilled] ohms [Sensing Amplitude ___mv] : sensing amplitude was [unfilled] mv [___V @] : [unfilled] V [___ ms] : [unfilled] ms [de-identified] : [employer]  [de-identified] : Nigel [de-identified] : [id number] [de-identified] : 1/16/19 [de-identified] : DDDR ADIR [de-identified] : 62 [de-identified] : 6 y 2 mo [de-identified] : No events\par AP 98\par  5\par \par

## 2022-11-22 NOTE — ADDENDUM
[FreeTextEntry1] : I, Gabrielle Jimenez, hereby attest that the medical record entry for this patient accurately reflects signatures/notations that I made on the Date of Service in my capacity as an Attending Physician when I treated/diagnosed the above patient. I do hereby attest that this information is true, accurate and complete to the best of my knowledge.  I was present for the entire visit and supervised the entire visit and made/agree with the plan as outlined.\par

## 2023-02-13 ENCOUNTER — NON-APPOINTMENT (OUTPATIENT)
Age: 78
End: 2023-02-13

## 2023-02-13 ENCOUNTER — APPOINTMENT (OUTPATIENT)
Dept: HEART AND VASCULAR | Facility: CLINIC | Age: 78
End: 2023-02-13
Payer: MEDICARE

## 2023-02-13 PROCEDURE — 93296 REM INTERROG EVL PM/IDS: CPT

## 2023-02-13 PROCEDURE — 93294 REM INTERROG EVL PM/LDLS PM: CPT

## 2023-05-15 ENCOUNTER — APPOINTMENT (OUTPATIENT)
Dept: HEART AND VASCULAR | Facility: CLINIC | Age: 78
End: 2023-05-15

## 2023-08-14 ENCOUNTER — APPOINTMENT (OUTPATIENT)
Dept: HEART AND VASCULAR | Facility: CLINIC | Age: 78
End: 2023-08-14

## 2023-09-19 ENCOUNTER — APPOINTMENT (OUTPATIENT)
Dept: HEART AND VASCULAR | Facility: CLINIC | Age: 78
End: 2023-09-19

## 2023-10-23 ENCOUNTER — APPOINTMENT (OUTPATIENT)
Dept: HEART AND VASCULAR | Facility: CLINIC | Age: 78
End: 2023-10-23

## 2023-11-13 ENCOUNTER — NON-APPOINTMENT (OUTPATIENT)
Age: 78
End: 2023-11-13

## 2023-11-13 ENCOUNTER — APPOINTMENT (OUTPATIENT)
Dept: HEART AND VASCULAR | Facility: CLINIC | Age: 78
End: 2023-11-13
Payer: MEDICARE

## 2023-11-13 VITALS
WEIGHT: 194 LBS | HEIGHT: 63 IN | BODY MASS INDEX: 34.38 KG/M2 | OXYGEN SATURATION: 97 % | HEART RATE: 73 BPM | DIASTOLIC BLOOD PRESSURE: 68 MMHG | SYSTOLIC BLOOD PRESSURE: 108 MMHG

## 2023-11-13 PROCEDURE — 93280 PM DEVICE PROGR EVAL DUAL: CPT

## 2024-02-16 ENCOUNTER — NON-APPOINTMENT (OUTPATIENT)
Age: 79
End: 2024-02-16

## 2024-02-16 ENCOUNTER — APPOINTMENT (OUTPATIENT)
Dept: HEART AND VASCULAR | Facility: CLINIC | Age: 79
End: 2024-02-16
Payer: MEDICARE

## 2024-02-16 PROCEDURE — 93294 REM INTERROG EVL PM/LDLS PM: CPT

## 2024-02-16 PROCEDURE — 93296 REM INTERROG EVL PM/IDS: CPT

## 2024-06-08 ENCOUNTER — APPOINTMENT (OUTPATIENT)
Dept: PLASTIC SURGERY | Facility: CLINIC | Age: 79
End: 2024-06-08
Payer: MEDICARE

## 2024-06-08 DIAGNOSIS — M65.332 TRIGGER FINGER, LEFT MIDDLE FINGER: ICD-10-CM

## 2024-06-08 DIAGNOSIS — G56.02 CARPAL TUNNEL SYNDROME, LEFT UPPER LIMB: ICD-10-CM

## 2024-06-08 PROCEDURE — 99204 OFFICE O/P NEW MOD 45 MIN: CPT

## 2024-06-08 NOTE — PHYSICAL EXAM
[de-identified] : left upper extremity shoulder and elbow, no restriction, +ROM positive for Tinel's 2+ median motor nerve decreased sensation in radial digits and questionable ulnar digits  positive elbow compression test  right upper-elbow unrestricted motion deformity on wrist with ulnar deformity  [TextEntry] : Constitutional: NAD, well-nourished HENT: Normocephalic, atraumatic, PERRL, non-icteric sclerae, neck supple, trachea midline PULM: LSCTAB, no wheezing or rhonchi CV: RRR, no murmur, rubs, or gallops Abd: Soft, NT, ND, +BS, no palpable masses or bulge MSK: ROBLES Skin: No rash or lesions noted Neuro: A&O x 3, no FND Psych: Mood is appropriate

## 2024-06-08 NOTE — HISTORY OF PRESENT ILLNESS
[FreeTextEntry1] : Ms. HDZ is a 78 year F presenting to the office today complaining of pain and numbness in her left hand. Patient referred by her sister.  The patient reports this has been occurring for 3 years. She is receiving care at [facility]. She reports that she underwent x-rays as well as an EMG study done last year. The patient also reports that she has done therapy with some initial improvement; however, the improvement is now only minimal. She also c/o left middle finger locking and numbness of fingers, especially the small digit. She reports the symptoms worsen at night. She also reports a history of trauma to right wrist area, stating that she fell while in the [place]. She was treated non-operatively for this issue. She is currently getting care for right hand wrist area by another surgeon.  Otherwise, denies cp, sob, subjective fever, chills, headache, cough, myalgia, malaise, abd pain, n/v/d, decreased appetite, and generalized weakness.  PMHx- HTN, accelerated idioventricular rhythm, osteoarthritis of knees, Hyperlipidemia, GERD, Hypothyroidism PSHx- total bilateral knee replacement, PPM placement 2019 Currently taking- Isosorbide 30 MG, Aspirin 81 MG, Atorvastatin 40 MG, Hyzaar 100-12.5 MG, Levothyroxine, Levocentrizine 5 MG Allergies- NKDA Family history- denies Smoking- denied

## 2024-06-08 NOTE — END OF VISIT
[FreeTextEntry3] : All medical entries made by the scribe were at my, Dr. Jeb Starr's, direction and personally dictated by me on 06/08/2024. I have reviewed the chart and agree that the record accurately reflects my personal performance of the history, assessment and plan. I have also personally directed, reviewed, and agreed with the chart.

## 2024-06-08 NOTE — REVIEW OF SYSTEMS
[Arthralgias] : arthralgias [Negative] : Heme/Lymph [TextEntry] : A detailed set of ROS were asked and negative except those outlined in HPI.

## 2024-06-08 NOTE — PHYSICAL EXAM
[de-identified] : left upper extremity shoulder and elbow, no restriction, +ROM positive for Tinel's 2+ median motor nerve decreased sensation in radial digits and questionable ulnar digits  positive elbow compression test  right upper-elbow unrestricted motion deformity on wrist with ulnar deformity  [TextEntry] : Constitutional: NAD, well-nourished HENT: Normocephalic, atraumatic, PERRL, non-icteric sclerae, neck supple, trachea midline PULM: LSCTAB, no wheezing or rhonchi CV: RRR, no murmur, rubs, or gallops Abd: Soft, NT, ND, +BS, no palpable masses or bulge MSK: ROBLES Skin: No rash or lesions noted Neuro: A&O x 3, no FND Psych: Mood is appropriate

## 2024-06-08 NOTE — HISTORY OF PRESENT ILLNESS
[FreeTextEntry1] : Ms. HDZ is a 78 year F presenting to the office today complaining of pain and numbness in her left hand. Patient referred by her sister.  The patient reports this has been occurring for 3 years. She is receiving care at [facility]. She reports that she underwent x-rays as well as an EMG study done last year. The patient also reports that she has done therapy with some initial improvement; however, the improvement is now only minimal. She also c/o left middle finger locking and numbness of fingers, especially the small digit. She reports the symptoms worsen at night. She also reports a history of trauma to right wrist area, stating that she fell while in the [place]. She was treated non-operatively for this issue. She is currently getting care for right hand wrist area by another surgeon.  Otherwise, denies cp, sob, subjective fever, chills, headache, cough, myalgia, malaise, abd pain, n/v/d, decreased appetite, and generalized weakness.  PMHx- HTN, accelerated idioventricular rhythm, osteoarthritis of knees, Hyperlipidemia, GERD, Hypothyroidism PSHx- total bilateral knee replacement, PPM placement 2019 Currently taking- Isosorbide 30 MG, Aspirin 81 MG, Atorvastatin 40 MG, Hyzaar 100-12.5 MG, Levothyroxine, Levocentrizine 5 MG Allergies- NKDA Family history- denies Smoking- denied

## 2024-06-08 NOTE — ADDENDUM
[FreeTextEntry1] :  This note was written by Mandy Villatoro on 06/08/2024 acting as a medical scribe for Dr. Jeb Starr MD.

## 2024-07-10 ENCOUNTER — NON-APPOINTMENT (OUTPATIENT)
Age: 79
End: 2024-07-10

## 2024-07-11 ENCOUNTER — APPOINTMENT (OUTPATIENT)
Dept: HEART AND VASCULAR | Facility: CLINIC | Age: 79
End: 2024-07-11
Payer: MEDICARE

## 2024-07-11 PROCEDURE — 93296 REM INTERROG EVL PM/IDS: CPT

## 2024-07-11 PROCEDURE — 93294 REM INTERROG EVL PM/LDLS PM: CPT

## 2024-07-29 ENCOUNTER — APPOINTMENT (OUTPATIENT)
Dept: HEART AND VASCULAR | Facility: CLINIC | Age: 79
End: 2024-07-29

## 2024-08-30 ENCOUNTER — APPOINTMENT (OUTPATIENT)
Dept: HEART AND VASCULAR | Facility: CLINIC | Age: 79
End: 2024-08-30

## 2024-10-07 ENCOUNTER — APPOINTMENT (OUTPATIENT)
Dept: HEART AND VASCULAR | Facility: CLINIC | Age: 79
End: 2024-10-07

## 2024-11-08 ENCOUNTER — APPOINTMENT (OUTPATIENT)
Dept: HEART AND VASCULAR | Facility: CLINIC | Age: 79
End: 2024-11-08

## 2024-11-08 VITALS
BODY MASS INDEX: 33.89 KG/M2 | WEIGHT: 191.25 LBS | HEIGHT: 63 IN | SYSTOLIC BLOOD PRESSURE: 111 MMHG | DIASTOLIC BLOOD PRESSURE: 71 MMHG | TEMPERATURE: 97.9 F | HEART RATE: 68 BPM | OXYGEN SATURATION: 100 %

## 2024-11-08 DIAGNOSIS — Z95.0 PRESENCE OF CARDIAC PACEMAKER: ICD-10-CM

## 2024-11-08 PROCEDURE — 93283 PRGRMG EVAL IMPLANTABLE DFB: CPT

## 2024-11-12 PROBLEM — Z95.0 PACEMAKER: Status: ACTIVE | Noted: 2024-11-12

## 2024-12-31 ENCOUNTER — APPOINTMENT (OUTPATIENT)
Dept: HEART AND VASCULAR | Facility: CLINIC | Age: 79
End: 2024-12-31

## 2024-12-31 PROCEDURE — 93294 REM INTERROG EVL PM/LDLS PM: CPT

## 2024-12-31 PROCEDURE — 93296 REM INTERROG EVL PM/IDS: CPT

## 2025-04-02 ENCOUNTER — APPOINTMENT (OUTPATIENT)
Dept: HEART AND VASCULAR | Facility: CLINIC | Age: 80
End: 2025-04-02

## 2025-05-06 ENCOUNTER — APPOINTMENT (OUTPATIENT)
Dept: HEART AND VASCULAR | Facility: CLINIC | Age: 80
End: 2025-05-06

## 2025-06-06 ENCOUNTER — APPOINTMENT (OUTPATIENT)
Dept: PLASTIC SURGERY | Facility: CLINIC | Age: 80
End: 2025-06-06

## 2025-06-11 ENCOUNTER — APPOINTMENT (OUTPATIENT)
Dept: HEART AND VASCULAR | Facility: CLINIC | Age: 80
End: 2025-06-11